# Patient Record
Sex: FEMALE | URBAN - METROPOLITAN AREA
[De-identification: names, ages, dates, MRNs, and addresses within clinical notes are randomized per-mention and may not be internally consistent; named-entity substitution may affect disease eponyms.]

---

## 2020-09-30 ENCOUNTER — OFFICE VISIT (OUTPATIENT)
Dept: URBAN - METROPOLITAN AREA CLINIC 23 | Facility: CLINIC | Age: 65
End: 2020-09-30
Payer: COMMERCIAL

## 2020-09-30 DIAGNOSIS — H25.11 AGE-RELATED NUCLEAR CATARACT, RIGHT EYE: ICD-10-CM

## 2020-09-30 DIAGNOSIS — H43.811 VITREOUS DEGENERATION, RIGHT EYE: Primary | ICD-10-CM

## 2020-09-30 PROCEDURE — 92004 COMPRE OPH EXAM NEW PT 1/>: CPT | Performed by: OPTOMETRIST

## 2020-09-30 ASSESSMENT — VISUAL ACUITY
OS: 20/40
OD: 20/40

## 2020-09-30 ASSESSMENT — INTRAOCULAR PRESSURE
OD: 18
OS: 16

## 2020-09-30 ASSESSMENT — KERATOMETRY
OD: 44.75
OS: 44.75

## 2020-09-30 NOTE — IMPRESSION/PLAN
Impression: Age-related nuclear cataract, right eye: H25.11 Right. Plan: Discussed diagnosis in detail with patient. Cataracts affecting vision some, but no surgery is currently recommended. The patient will monitor vision changes and contact us with any decrease in vision. Continue to monitor.

## 2020-11-09 ENCOUNTER — OFFICE VISIT (OUTPATIENT)
Dept: URBAN - METROPOLITAN AREA CLINIC 23 | Facility: CLINIC | Age: 65
End: 2020-11-09
Payer: COMMERCIAL

## 2020-11-09 PROCEDURE — 92012 INTRM OPH EXAM EST PATIENT: CPT | Performed by: OPTOMETRIST

## 2020-11-09 ASSESSMENT — INTRAOCULAR PRESSURE
OD: 15
OS: 17

## 2020-11-09 NOTE — IMPRESSION/PLAN
Impression: Vitreous degeneration, right eye: H43.811 Right. Plan: Discussed diagnosis in detail with patient. Posterior vitreous detachment accounts for the patient's complaints. There is no evidence of associated retinal pathology. All signs and risks of retinal detachment and tears were discussed. Patient instructed to call the office immediately if symptoms worsen. Will continue to observe. Patient to schedule appt in 6 months if condition persists and bothersome, to discuss vitrectomy.

## 2021-01-21 ENCOUNTER — OFFICE VISIT (OUTPATIENT)
Dept: URBAN - METROPOLITAN AREA CLINIC 23 | Facility: CLINIC | Age: 66
End: 2021-01-21
Payer: COMMERCIAL

## 2021-01-21 PROCEDURE — 99214 OFFICE O/P EST MOD 30 MIN: CPT | Performed by: OPTOMETRIST

## 2021-01-21 ASSESSMENT — INTRAOCULAR PRESSURE
OD: 19
OS: 22

## 2021-01-21 NOTE — IMPRESSION/PLAN
Impression: Age-related nuclear cataract, bilateral: H25.13 Right. Plan: Cataracts account for the patient's complaints. No treatment currently recommended. The patient will monitor vision changes and contact us with any decrease in vision.

## 2021-02-18 ENCOUNTER — OFFICE VISIT (OUTPATIENT)
Dept: URBAN - METROPOLITAN AREA CLINIC 33 | Facility: CLINIC | Age: 66
End: 2021-02-18
Payer: COMMERCIAL

## 2021-02-18 DIAGNOSIS — H43.391 OTHER VITREOUS OPACITIES, RIGHT EYE: Primary | ICD-10-CM

## 2021-02-18 DIAGNOSIS — H43.311 VITREOUS MEMBRANES AND STRANDS, RIGHT EYE: ICD-10-CM

## 2021-02-18 PROCEDURE — 99204 OFFICE O/P NEW MOD 45 MIN: CPT | Performed by: OPHTHALMOLOGY

## 2021-02-18 PROCEDURE — 92134 CPTRZ OPH DX IMG PST SGM RTA: CPT | Performed by: OPHTHALMOLOGY

## 2021-02-18 RX ORDER — PREDNISOLONE ACETATE 10 MG/ML
1 % SUSPENSION/ DROPS OPHTHALMIC
Qty: 10 | Refills: 5 | Status: INACTIVE
Start: 2021-02-18 | End: 2021-03-25

## 2021-02-18 RX ORDER — OFLOXACIN 3 MG/ML
0.3 % SOLUTION/ DROPS OPHTHALMIC
Qty: 5 | Refills: 3 | Status: ACTIVE
Start: 2021-02-18

## 2021-02-18 ASSESSMENT — INTRAOCULAR PRESSURE
OS: 17
OD: 18

## 2021-02-18 NOTE — IMPRESSION/PLAN
Impression: Other vitreous opacities, right eye: H43.391. Right. Condition: worsening. Vision: vision affected. s/p PPVX for Removal of Floaters OS 03/16/2016 w/Dr Crane Plan: Discussed diagnosis in detail with patient. No treatment is required at this time. Patient states floaters are very bothersome, vision is affected and interferes with daily activities. Surgical treatment is recommended based on patient's complaints PPVx RIGHT EYE for possible vision improvement. Surgical risks and benefits were discussed, explained and understood by patient. All questions answered. Patient elects to proceed with recommendation. RL1. Educational material provided to patient. OCT is stable OU. Erx Prednisolone and Ofloxacin to patient's pharmacy.

## 2021-02-18 NOTE — IMPRESSION/PLAN
Impression: Vitreous membranes and strands, right eye: H43.311. Right. Condition: worsening. Vision: vision affected. Plan: Discussed diagnosis in detail with patient. Surgical treatment is required - see notes above.

## 2021-03-16 ENCOUNTER — SURGERY (OUTPATIENT)
Dept: URBAN - METROPOLITAN AREA SURGERY 11 | Facility: SURGERY | Age: 66
End: 2021-03-16
Payer: COMMERCIAL

## 2021-03-16 PROCEDURE — 67041 VIT FOR MACULAR PUCKER: CPT | Performed by: OPHTHALMOLOGY

## 2021-03-17 ENCOUNTER — POST-OPERATIVE VISIT (OUTPATIENT)
Dept: URBAN - METROPOLITAN AREA CLINIC 23 | Facility: CLINIC | Age: 66
End: 2021-03-17

## 2021-03-17 PROCEDURE — 99024 POSTOP FOLLOW-UP VISIT: CPT | Performed by: OPTOMETRIST

## 2021-03-17 ASSESSMENT — INTRAOCULAR PRESSURE
OS: 18
OD: 18

## 2021-03-17 NOTE — IMPRESSION/PLAN
Impression: S/P PPVx 27ga; Epiretinal Membranectomy OD - 1 Day. Encounter for surgical aftercare following surgery on a sense organ  Z48.810. Post operative instructions reviewed - Plan: Use drops as directed above and on handout. Return if sudden vision change or increasing pain.  Patch OD today until diplopia resolves --Continue Ofloxacin 0.3%, start prednisolone qid OD

## 2021-03-18 ENCOUNTER — POST-OPERATIVE VISIT (OUTPATIENT)
Dept: URBAN - METROPOLITAN AREA CLINIC 23 | Facility: CLINIC | Age: 66
End: 2021-03-18

## 2021-03-18 PROCEDURE — 99024 POSTOP FOLLOW-UP VISIT: CPT | Performed by: OPTOMETRIST

## 2021-03-18 NOTE — IMPRESSION/PLAN
Impression: S/P ppvx, ermx, kenalog OS - 1828 Days. Encounter for surgical aftercare following surgery on a sense organ  Z48.810. Plan: Use drops as directed above and on handout. Return if sudden vision change or increasing pain.  --Continue Ofloxacin 0.3%--Continue Prednisolone acetate 1%

## 2021-03-25 ENCOUNTER — POST-OPERATIVE VISIT (OUTPATIENT)
Dept: URBAN - METROPOLITAN AREA CLINIC 23 | Facility: CLINIC | Age: 66
End: 2021-03-25

## 2021-03-25 DIAGNOSIS — Z48.810 ENCOUNTER FOR SURGICAL AFTERCARE FOLLOWING SURGERY ON A SENSE ORGAN: Primary | ICD-10-CM

## 2021-03-25 PROCEDURE — 99024 POSTOP FOLLOW-UP VISIT: CPT | Performed by: OPHTHALMOLOGY

## 2021-03-25 RX ORDER — PREDNISOLONE ACETATE 10 MG/ML
1 % SUSPENSION/ DROPS OPHTHALMIC
Qty: 10 | Refills: 5 | Status: ACTIVE
Start: 2021-03-25

## 2021-03-25 ASSESSMENT — INTRAOCULAR PRESSURE
OS: 18
OD: 18

## 2021-03-25 NOTE — IMPRESSION/PLAN
Impression: S/P PPVx 27ga; Epiretinal Membranectomy OD - 9 Days. Encounter for surgical aftercare following surgery on a sense organ  Z48.810. Excellent post op course   Post operative instructions reviewed - Condition is improving - Plan: Exam OD shows the retina is stabilizing, the redness should clear up on its own. Continue using PF OD and increase to 6 x'S a day (will let the pharmacy know that qtts have been increased) d/c Ofloxacin. Recommend a retina f/u in 1 month. OCT and Optos OD shows the macula is stable.

## 2021-04-21 ENCOUNTER — POST-OPERATIVE VISIT (OUTPATIENT)
Dept: URBAN - METROPOLITAN AREA CLINIC 23 | Facility: CLINIC | Age: 66
End: 2021-04-21

## 2021-04-21 PROCEDURE — 99024 POSTOP FOLLOW-UP VISIT: CPT | Performed by: OPTOMETRIST

## 2021-04-21 ASSESSMENT — INTRAOCULAR PRESSURE
OD: 22
OS: 20

## 2021-04-21 NOTE — IMPRESSION/PLAN
Impression: S/P PPVx 27ga; Epiretinal Membranectomy OD - 36 Days. Encounter for surgical aftercare following surgery on a sense organ  Z48.810. OCT normal. Ptosis. [facility] [place] Plan: Discussed all findings are improving. Unclear why she has ghost images. Recommend waiting for more healing. Consider refraction PRN.  --Continue Prednisolone acetate 1% qid OD

## 2021-04-30 ENCOUNTER — POST-OPERATIVE VISIT (OUTPATIENT)
Dept: URBAN - METROPOLITAN AREA CLINIC 23 | Facility: CLINIC | Age: 66
End: 2021-04-30

## 2021-04-30 PROCEDURE — 99024 POSTOP FOLLOW-UP VISIT: CPT | Performed by: OPHTHALMOLOGY

## 2021-04-30 ASSESSMENT — INTRAOCULAR PRESSURE
OD: 19
OS: 18

## 2021-04-30 NOTE — IMPRESSION/PLAN
Impression: S/P PPVx 27ga; Epiretinal Membranectomy OD - 45 Days. Encounter for surgical aftercare following surgery on a sense organ  Z48.810. Excellent post op course   Post operative instructions reviewed - Condition is improving - OCT OD shows stable no active edema no ERM and Optos OD shows stable, clear view no ERM Plan: Discussed with patient that her eye is stable and the vision is improved when she looks through the pinholes 20/30. Recommend patient schedule a refraction with optometrist for glasses refraction/contact lens refraction. Patient states she is noticing her eye lid is droopy on the right eye, advised patient that the left lid is more droopy. Recommend patient schedule consult with Oculoplastics for a lid evaluation with Dr. Sneha Parra.